# Patient Record
Sex: MALE | Race: WHITE | URBAN - METROPOLITAN AREA
[De-identification: names, ages, dates, MRNs, and addresses within clinical notes are randomized per-mention and may not be internally consistent; named-entity substitution may affect disease eponyms.]

---

## 2019-11-14 ENCOUNTER — HOSPITAL ENCOUNTER (OUTPATIENT)
Dept: REHABILITATION | Facility: CLINIC | Age: 74
End: 2019-11-14
Attending: PHYSICIAN ASSISTANT
Payer: COMMERCIAL

## 2019-11-14 ENCOUNTER — DOCUMENTATION ONLY (OUTPATIENT)
Dept: OTHER | Facility: CLINIC | Age: 74
End: 2019-11-14

## 2019-11-14 PROCEDURE — S5102 ADULT DAY CARE PER DIEM: HCPCS

## 2019-11-14 NOTE — PROGRESS NOTES
"SELF-PRESERVATION FORM  [facility]    Member Name: Jatin Easton; YOB: 1945  MRN: [id number]  11/14/2019    A. The person is ambulatory or mobile. Yes  B. The person has the combined physical and mental capacity to:  1. Recognize a danger, signal or alarm requiring evacuation from the center: Yes  2. Initiate and complete the evacuation without requiring more than sporadic assistance from another person, such as help in opening a door or getting into a wheelchair: No  3. Select an alternative means of escape or take another appropriate action if the primary escape route is blocked: No  4. Remain at a designated location outside the center until further instruction is given: Yes    \"Capable of taking appropriate action for self-preservation under emergency conditions\" is the designation applied in parts 3621.5101 to 3953.1019 to an adult who meets the criteria in items A and B.    FAC Self-Preservation Status: Not capable of self-preservation    "

## 2019-11-14 NOTE — PROGRESS NOTES
Individual abuse prevention plan (IAPP)  [facility]     Assessment of Susceptibility to Abuse, Including Self Abuse, Neglect (Identification of characteristics, which make the individual susceptible to abuse, and how these characteristics cause the individual to be susceptible to abuse.)     Is the person susceptible to abuse in each area?  Sexual Abuse:   Significant cognitive impairment  Physically unable to refuse advances  Specific measure to minimize risk or sexual abuse for each area identified: See below.   Will have staff present at all times.    Referrals made when the person is susceptible to abuse outside the scope or control of this program (Identify the referral and date it occurred): No additional risk reduction means needed at this time    Physical Abuse:   Inability to identify potentially dangerous situations  Physically unable to defend themselves  Significant cognitive impairments  Specific measure to minimize risk or physical abuse for each area identified: See below. Will use the EZ stand to transfer for bathing needs as needed.  Will use his manual w/c while at the day program.  Referrals made when the person is susceptible to abuse outside the scope or control of this program (Identify the referral and date it occurred): No additional risk reduction means needed at this time    Self-Abuse:   No  Referrals made when the person is susceptible to abuse outside the scope or control of this program (Identify the referral and date it occurred): No additional risk reduction means needed at this time    Financial  Exploitation  No.  Wife/Guardian handles the finances.   Referrals made when the person is susceptible to abuse outside the scope or control of this program (Identify the referral and date it occurred): No additional risk reduction means needed at this time    Is the program aware of this person committing a violent crime or act of physical aggression towards  others?  No  Referrals made when the person is susceptible to abuse outside the scope or control of this program (Identify the referral and date it occurred): No additional risk reduction means needed at this time    INDIVIDUAL ABUSE PREVENTION PLAN-MEASURES TAKEN TO MINIMIZE RISK OF ABUSE   ADL:   Assist with toileting as needed.  He self-cathes at home.    Ambulation/Transfers/Wheelchairs:  Use of mechanical lift for transfers  Encourage use of wheelchair for distances .  Will probably be using manual chair at the day program.  May need assistance with longer distances as needed.   Behavior:   No behavioral concerns.  Very pleasant.  Communication:  Encourage verbalization of needs/concerns  Cognitive Issues:   Provider reminders due to confusion, forgetfulness  Give simple step-by-step direction  Contact caregiver to inform of special activities days  Diet:   Regular diet.  Encourage small bites and independence with self-feeding.  Exercise:   Encourage participation in maintenance program  Encourage participation in wheelchair aerobics  Hearing:   No concerns.   Isolation:   Lives at home with his wife.  Looking for increased socialization beyond the 2 of them.  Medical Monitoring:   Encourage rest period  Monitor physical symptoms due to diagnosis and report significant changes to nurse, caregiver and physician   Mental Health:   Motivate client to join in activities that are beneficial to client  Provide activities in which client can be successful  Sensory:   Provide variety of sensory groups to maintain current cognitive level  Vision:   Ensure client is wearing glasses and clean prn.  Uses glasses for near work.   Other: [place] lanyard due to cognitive deficits.     Developed in consultation with:  Client  The Program Abuse Prevention Plan/IAPP identifies the specific actions that minimize abuse to the [facility] participant.  Yes

## 2019-11-14 NOTE — PROGRESS NOTES
[facility] Social History  Full Name: Jatin Easton       MRN: [id number]    YOB: 1945  Nickname:Epifanio      Sex: Male     Home Phone: [phone]      Cell Phone: [phone]  Address: [address] [place], [place] [address]Zip: [place], [place]  [address]  County: [place]      E-mail:ANDRE        Transportation:Family   Language:English         needed? 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 Yi- wife  Current living arrangement:  Home - Townhouse  Number of Children: 3  List: Yadiel, Ayla and Abdoulaye  Number of Siblings: 3     List: Obie, Dm and Sol  Other Important People/Pets: NA  What else should we know about you? Epifanio has been very comfortable being home since he retired 7 years ago.  He chooses not being involved and has just shown more interest in things lately.     Primary Care Provider: Krysten Abad        Neurologist: Dr. Jatin Lopez  Emergency Contacts:  1. Yi Easton      Relationship: wife    Phone: [phone]  2. Ayla Victor Hugo         Relationship: daughter   Phone:[phone]  Abdoulaye Easton- son   [phone]

## 2019-11-14 NOTE — PROGRESS NOTES
[facility] Note:  The Robi Cognitive Assessment (MoCa) was completed on this day program member on this date. The patient scored 20/30. The MOCA is a 30 point cognitive screening assessment. Normal score is considered ? 26/30. The following ranges may be used to grade severity: 18-26 = mild cognitive impairment, 10-17= moderate cognitive impairment (MCI) and less than 10= severe cognitive impairment. The MoCA assesses different cognitive domains including attention and concentration, executive functions, memory, language, visuoconstructional skills, conceptual thinking, calculations and orientation. Deficits noted in memory, visuospatial/executive skills, recall and attention.

## 2019-11-15 NOTE — PROGRESS NOTES
[facility] Note:  Member completed orientation paperwork on his first day at the [facility].  His wife, Yi Easton, who is also his POA was present and signed the paperwork.  Epifanio will be getting transported to and from the [facility] by his wife.  He will arrive in a w/c.  He has a manual and electric w/c.  We anticipate him using the manual chair while he is here.  He typically straight caths at home.  He will wait until he arrives at home before cathing in the afternoon. He will also wear depends while he is here.  If needed to transfer to the toilet for a bowel movement he will need to use the EZ stand.  He will wear a black lanyard.  He is able to feed himself and is on a regular diet with no dietary restrictions or allergies.  He is a retired  and is looking to socialize with others.  He is very excited to get started at the [facility].  Will ask MD for PT orders to set up a maintenance program while he is at the .

## 2019-11-21 ENCOUNTER — HOSPITAL ENCOUNTER (OUTPATIENT)
Dept: REHABILITATION | Facility: CLINIC | Age: 74
End: 2019-11-21
Attending: PHYSICIAN ASSISTANT
Payer: COMMERCIAL

## 2019-11-21 PROCEDURE — S5102 ADULT DAY CARE PER DIEM: HCPCS

## 2019-12-05 ENCOUNTER — HOSPITAL ENCOUNTER (OUTPATIENT)
Dept: REHABILITATION | Facility: CLINIC | Age: 74
End: 2019-12-05
Attending: PHYSICIAN ASSISTANT
Payer: COMMERCIAL

## 2019-12-05 PROCEDURE — S5102 ADULT DAY CARE PER DIEM: HCPCS

## 2019-12-19 ENCOUNTER — HOSPITAL ENCOUNTER (OUTPATIENT)
Dept: REHABILITATION | Facility: CLINIC | Age: 74
End: 2019-12-19
Attending: PHYSICIAN ASSISTANT
Payer: COMMERCIAL

## 2019-12-19 PROCEDURE — S5102 ADULT DAY CARE PER DIEM: HCPCS

## 2019-12-26 ENCOUNTER — HOSPITAL ENCOUNTER (OUTPATIENT)
Dept: REHABILITATION | Facility: CLINIC | Age: 74
End: 2019-12-26
Attending: PHYSICIAN ASSISTANT
Payer: COMMERCIAL

## 2019-12-26 PROCEDURE — S5102 ADULT DAY CARE PER DIEM: HCPCS

## 2020-01-09 ENCOUNTER — HOSPITAL ENCOUNTER (OUTPATIENT)
Dept: REHABILITATION | Facility: CLINIC | Age: 75
End: 2020-01-09
Attending: PHYSICIAN ASSISTANT
Payer: COMMERCIAL

## 2020-01-09 PROCEDURE — S5102 ADULT DAY CARE PER DIEM: HCPCS

## 2020-01-10 ENCOUNTER — DOCUMENTATION ONLY (OUTPATIENT)
Dept: OTHER | Facility: CLINIC | Age: 75
End: 2020-01-10

## 2020-01-22 NOTE — PROGRESS NOTES
"MONTHLY PROGRESS NOTE AND PARTICIPATION REPORT   [facility]    Jatin Easton, 1945  Attendance: Please see Epic for attendance record.    Communication:   Does communicate   Hearing:   No impairment  Vision:   Glasses  Orientation/Cognition:   Minor forgetfulness  Behavior:   No behavioral concerns  Self-Preservation Skills:   Not capable of self-preservation  Eating:   Assist with set up  Ambulation Walking:   Non-ambulatory  Transferring:   Use of mechanical lift  Wheelchair:   Needs help  Toileting:   Needs assistance  Catheter  Maintenance Program:     None  Living Arrangements:   Lives with family  Spiritual Needs: Needs are being met through support group  Medication Assistance:   Medication not taken during program hours  Participation Report:   Aerobics/Exercise  Support Group  Cognitive Stimulation  Creative Arts/Crafts  Games  Speakers/Entertainment  Socialization  Current Events/News  Education/Health Topic  Meditation, Prayer, Piano, Communion and Yoga; Improv with [employer], and talking about fun events happening in the community for the month of January. We also had themed Brain and Body activities that were centered around a New Year's Party to celebrate 2020, a Virtual Sled Dog Fitness Challenge, Himanshu Godoy Junior \"I have a dream\" speech, talking and exploring the [facility] Carnival, and talking all about the Super Bowl!    Level of Participation:   Active      "

## 2020-02-06 ENCOUNTER — HOSPITAL ENCOUNTER (OUTPATIENT)
Dept: REHABILITATION | Facility: CLINIC | Age: 75
End: 2020-02-06
Attending: PHYSICIAN ASSISTANT
Payer: COMMERCIAL

## 2020-02-06 PROCEDURE — S5102 ADULT DAY CARE PER DIEM: HCPCS

## 2020-02-06 NOTE — PROGRESS NOTES
AC RN Monthly Progress Note  Previous documentation reviewed. No concerns reported by AC staff or member.   Jan and Feb review complete.

## 2020-02-25 NOTE — PROGRESS NOTES
MONTHLY PROGRESS NOTE AND PARTICIPATION REPORT   [facility]    Jatin Easton, 1945  Attendance: Please see Epic for attendance record.    Communication:   Does communicate   Hearing:   No impairment  Vision:   Glasses  Orientation/Cognition:   Minor forgetfulness  Behavior:   No behavioral concerns  Self-Preservation Skills:   Not capable of self-preservation  Eating:   Assist with set up  Ambulation Walking:   Non-ambulatory  Transferring:   Use of mechanical lift  Wheelchair:   Needs help  Toileting:   Needs assistance  Catheter  Maintenance Program:     None  Living Arrangements:   Lives with family  Spiritual Needs: Needs are being met through support group  Medication Assistance:   Medication not taken during program hours  Participation Report:   Aerobics/Exercise  Support Group  Cognitive Stimulation  Creative Arts/Crafts  Games  Speakers/Entertainment  Socialization  Current Events/News  Education/Health Topic  Meditation, Prayer, Piano, Communion, and Yoga; Improv with [employer], and talking about fun events happening in the community for the month of February. We had themed Brain and Body activities including Academy Awards history, Black History Month celebration, a themed week on Valentines day's history with games and trivia, and a themed week celebrating Alejandrojevon Parisi with decorations and mask making!  Level of Participation:   Active

## 2020-02-27 ENCOUNTER — HOSPITAL ENCOUNTER (OUTPATIENT)
Dept: REHABILITATION | Facility: CLINIC | Age: 75
End: 2020-02-27
Attending: PHYSICIAN ASSISTANT
Payer: COMMERCIAL

## 2020-02-27 PROCEDURE — S5102 ADULT DAY CARE PER DIEM: HCPCS

## 2020-03-05 ENCOUNTER — HOSPITAL ENCOUNTER (OUTPATIENT)
Dept: REHABILITATION | Facility: CLINIC | Age: 75
End: 2020-03-05
Attending: PHYSICIAN ASSISTANT
Payer: COMMERCIAL

## 2020-03-05 PROCEDURE — S5102 ADULT DAY CARE PER DIEM: HCPCS

## 2020-04-14 NOTE — PROGRESS NOTES
MONTHLY PROGRESS NOTE AND PARTICIPATION REPORT   [facility]    Jatin Easton, 1945  Attendance: Please see Epic for attendance record.    Communication:   Does communicate   Hearing:   No impairment  Vision:   Glasses  Orientation/Cognition:   Minor forgetfulness  Behavior:   No behavioral concerns  Self-Preservation Skills:   Not capable of self-preservation  Eating:   Assist with set up  Ambulation Walking:   Non-ambulatory  Transferring:   Use of mechanical lift  Wheelchair:   Needs help  Toileting:   Needs assistance  Catheter  Maintenance Program:     None  Living Arrangements:   Lives with family  Spiritual Needs: Needs are being met through support group  Medication Assistance:   Medication not taken during program hours  Participation Report:   Aerobics/Exercise  Support Group  Cognitive Stimulation  Creative Arts/Crafts  Games  Speakers/Entertainment  Socialization  Current Events/News  Education/Health Topic  Meditation, Prayer, Piano, Communion, and Yoga; finishing Improv with [employer] and helping them make a video for their website, as well as talking about fun events happening in the community for the month of March. We had themed Brain and Body activities including voting on whether we thought March was going to go out like a lion or a lamb, celebrating women's history month, and a themed week on St. Patrick's day with games and trivia.  Level of Participation:   Active     **March**

## 2020-04-14 NOTE — PROGRESS NOTES
I spoke with patient's wife today.  They are doing well in their home.  They have had no PCAs since August, but are managing ok at this point. They continue to mourn the recent death of their daughter-in-law.

## 2020-06-18 NOTE — PROGRESS NOTES
INDIVIDUAL PLAN OF CARE   [facility]    Member Name: Jatin Easton; YOB: 1945  MRN: [id number]  6/18/2020      Unable to complete review at this time due to the COVID-19 pandemic.  Will continue to complete reviews once the day program has resumed services.  At this time, the member is not participating in alternative adult day services.

## 2020-06-18 NOTE — PROGRESS NOTES
Unable to complete review at this time due to the COVID-19 pandemic.  Will continue to complete reviews once the day program has resumed services.  At this time, the member is not participating in alternative adult day services.    [facility] Social History  Full Name: Jatin Easton       MRN: [id number]    YOB: 1945  Nickname:Epifanio      Sex: Male     Home Phone: [phone]      Cell Phone: [phone]  Address: [address] [place], [place] [address]Zip: [place]  [address]  County: [place]      E-mail:NA        Transportation:Family   Language:English         needed? 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 Yi- wife  Current living arrangement:  Home - Townhouse  Number of Children: 3  List: Yadiel, Ayla and Abdoulaye  Number of Siblings: 3     List: Obie, Dm and Sol  Other Important People/Pets: NA  What else should we know about you? Epifanio has been very comfortable being home since he retired 7 years ago.  He chooses not being involved and has just shown more interest in things lately.     Primary Care Provider: Krysten Abad        Neurologist: Dr. Jatin Lopez  Emergency Contacts:  1. Yi Easton      Relationship: wife    Phone: [phone]  2. Ayla Victor Hugo         Relationship: daughter   Phone:[phone]  Abdoulaye Easton- son   [phone]

## 2020-06-18 NOTE — PROGRESS NOTES
Unable to complete review at this time due to the COVID-19 pandemic.  Will continue to complete reviews once the day program has resumed services.  At this time, the member is not participating in alternative adult day services.    Individual abuse prevention plan (IAPP)  [facility]     Assessment of Susceptibility to Abuse, Including Self Abuse, Neglect (Identification of characteristics, which make the individual susceptible to abuse, and how these characteristics cause the individual to be susceptible to abuse.)     Is the person susceptible to abuse in each area?  Sexual Abuse:   Significant cognitive impairment  Physically unable to refuse advances  Specific measure to minimize risk or sexual abuse for each area identified: See below.   Will have staff present at all times.    Referrals made when the person is susceptible to abuse outside the scope or control of this program (Identify the referral and date it occurred): No additional risk reduction means needed at this time    Physical Abuse:   Inability to identify potentially dangerous situations  Physically unable to defend themselves  Significant cognitive impairments  Specific measure to minimize risk or physical abuse for each area identified: See below. Will use the EZ stand to transfer for bathing needs as needed.  Will use his manual w/c while at the day program.  Referrals made when the person is susceptible to abuse outside the scope or control of this program (Identify the referral and date it occurred): No additional risk reduction means needed at this time    Self-Abuse:   No  Referrals made when the person is susceptible to abuse outside the scope or control of this program (Identify the referral and date it occurred): No additional risk reduction means needed at this time    Financial  Exploitation  No.  Wife/Guardian handles the finances.   Referrals made when the person is susceptible to abuse outside the scope or control of  this program (Identify the referral and date it occurred): No additional risk reduction means needed at this time    Is the program aware of this person committing a violent crime or act of physical aggression towards others?  No  Referrals made when the person is susceptible to abuse outside the scope or control of this program (Identify the referral and date it occurred): No additional risk reduction means needed at this time    INDIVIDUAL ABUSE PREVENTION PLAN-MEASURES TAKEN TO MINIMIZE RISK OF ABUSE   ADL:   Assist with toileting as needed.  He self-cathes at home.    Ambulation/Transfers/Wheelchairs:  Use of mechanical lift for transfers  Encourage use of wheelchair for distances .  Will probably be using manual chair at the day program.  May need assistance with longer distances as needed.   Behavior:   No behavioral concerns.  Very pleasant.  Communication:  Encourage verbalization of needs/concerns  Cognitive Issues:   Provider reminders due to confusion, forgetfulness  Give simple step-by-step direction  Contact caregiver to inform of special activities days  Diet:   Regular diet.  Encourage small bites and independence with self-feeding.  Exercise:   Encourage participation in maintenance program  Encourage participation in wheelchair aerobics  Hearing:   No concerns.   Isolation:   Lives at home with his wife.  Looking for increased socialization beyond the 2 of them.  Medical Monitoring:   Encourage rest period  Monitor physical symptoms due to diagnosis and report significant changes to nurse, caregiver and physician   Mental Health:   Motivate client to join in activities that are beneficial to client  Provide activities in which client can be successful  Sensory:   Provide variety of sensory groups to maintain current cognitive level  Vision:   Ensure client is wearing glasses and clean prn.  Uses glasses for near work.   Other: [place] lanyard due to cognitive deficits.     Developed in consultation  with:  Client  The Program Abuse Prevention Plan/IAPP identifies the specific actions that minimize abuse to the [facility] participant.  Yes

## 2020-06-18 NOTE — PROGRESS NOTES
MONTHLY PROGRESS NOTE AND PARTICIPATION REPORT   [facility]    Jatin Easton, 1945  Attendance: Please see Epic for attendance record.     Unable to complete review at this time due to the COVID-19 pandemic.  Will continue to complete reviews once the day program has resumed services.  At this time, the member is not participating in alternative adult day services.

## 2020-07-06 ENCOUNTER — MEDICAL CORRESPONDENCE (OUTPATIENT)
Dept: HEALTH INFORMATION MANAGEMENT | Facility: CLINIC | Age: 75
End: 2020-07-06